# Patient Record
Sex: MALE | Race: BLACK OR AFRICAN AMERICAN | NOT HISPANIC OR LATINO | Employment: FULL TIME | URBAN - METROPOLITAN AREA
[De-identification: names, ages, dates, MRNs, and addresses within clinical notes are randomized per-mention and may not be internally consistent; named-entity substitution may affect disease eponyms.]

---

## 2022-05-11 ENCOUNTER — OFFICE VISIT (OUTPATIENT)
Dept: NEUROSURGERY | Facility: CLINIC | Age: 42
End: 2022-05-11

## 2022-05-11 VITALS
WEIGHT: 283.7 LBS | HEIGHT: 77 IN | DIASTOLIC BLOOD PRESSURE: 79 MMHG | SYSTOLIC BLOOD PRESSURE: 120 MMHG | BODY MASS INDEX: 33.5 KG/M2

## 2022-05-11 DIAGNOSIS — M54.42 CHRONIC MIDLINE LOW BACK PAIN WITH LEFT-SIDED SCIATICA: ICD-10-CM

## 2022-05-11 DIAGNOSIS — M51.26 HERNIATED NUCLEUS PULPOSUS, L4-5: ICD-10-CM

## 2022-05-11 DIAGNOSIS — R29.898 LEFT LEG WEAKNESS: ICD-10-CM

## 2022-05-11 DIAGNOSIS — G89.29 CHRONIC MIDLINE LOW BACK PAIN WITH LEFT-SIDED SCIATICA: ICD-10-CM

## 2022-05-11 DIAGNOSIS — M51.26 HERNIATED NUCLEUS PULPOSUS, L3-4: Primary | ICD-10-CM

## 2022-05-11 PROCEDURE — 99204 OFFICE O/P NEW MOD 45 MIN: CPT | Performed by: PHYSICIAN ASSISTANT

## 2022-05-11 RX ORDER — CYCLOBENZAPRINE HCL 10 MG
TABLET ORAL
COMMUNITY

## 2022-05-11 NOTE — PROGRESS NOTES
"Chief Complaint  Back Pain    Subjective          Josr Pope who is a 41 y.o. year old male who presents to [facility] for Evaluation of the Spine.     The patient complains of pain located in the lumbar spine.  Patients states the pain has been present for 2 years.  The pain came on gradually.  The pain scale level is 7.  The pain does radiate. Dermatomes are located on left Lumbar at: to the back of the knee..  The pain is constant and waxing/waning and described as sharp and dull.  The pain is worse at nighttime and awakens the patient at night. Patient states prolonged standing, prolonged sitting and sleeping certain ways makes the pain worse.  Patient states changing his positive frequently makes the pain better.    Associated Symptoms Include: Numbness and Tingling in the left foot. He does report subjective weakness which is intermittent. He denies loss of bowel or bladder control.  Conservative Interventions Include: Physical Therapy that was ineffective., NSAIDs that were ineffective., Muscle Relaxants that were ineffective. and  Chiropractor that was ineffective.    Was this the result of an injury or accident?: No    History of Previous Spinal Surgery?: No    This patient  reports that he has never smoked. He has never used smokeless tobacco.    Review of Systems   Musculoskeletal: Positive for back pain and myalgias.        Objective   Vital Signs:   /79 (BP Location: Left arm, Patient Position: Sitting)   Ht 195.6 cm (77\")   Wt 129 kg (283 lb 11.2 oz)   BMI 33.64 kg/m²       Physical Exam  Constitutional:       Appearance: Normal appearance. He is obese.   Pulmonary:      Effort: Pulmonary effort is normal.   Musculoskeletal:         General: Tenderness (midline lumbar spine, left SI joint area) present.      Comments: SLR on left causes pain in the left lower back   Neurological:      General: No focal deficit present.      Mental " Status: He is alert and oriented to person, place, and time.      Sensory: No sensory deficit.      Motor: No weakness.      Deep Tendon Reflexes: Reflexes normal.   Psychiatric:         Mood and Affect: Mood normal.         Behavior: Behavior normal.        Neurologic Exam     Mental Status   Oriented to person, place, and time.        Result Review :   I have personally reviewed the MRI of lumbar spine without contrast from 2/25/2022 which shows multilevel degenerative disc disease and facet arthropathy, at L3-L4 there is moderate to severe central canal stenosis, at L4-L5 there is a central disc protrusion possibly abutting the bilateral traversing L5 nerve roots, somewhat worse on the left.     Assessment and Plan    Diagnoses and all orders for this visit:    1. Herniated nucleus pulposus, L3-4 (Primary)  -     Ambulatory Referral to Pain Management  -     EMG & Nerve Conduction Test; Future    2. Herniated nucleus pulposus, L4-5  -     Ambulatory Referral to Pain Management  -     EMG & Nerve Conduction Test; Future    3. Left leg weakness  -     EMG & Nerve Conduction Test; Future    4. Chronic midline low back pain with left-sided sciatica  -     Ambulatory Referral to Pain Management  -     EMG & Nerve Conduction Test; Future    He had non-specific pain in the left lower extremity to behind the knee. He does have some numbness into the toes of the foot, but no specific toe. He does have changes on the MRI worse at L3-L4 with moderate to severe central canal stenosis. I do not expect surgery to be likely to help due to his non-specific pain.    He could consider a trial of LESB to assess benefit for his complaints. I will refer him to [facility] in [place] to consult for this treatment.    He reports some weakness in the left leg at times. I would consider NCV/EMG of the bilateral lower extremities to evaluate further. I will order today.    The patient was counseled on basic recommendations for the reduction  and prevention of back, neck, or spine pain in association with spinal disorders, including: cessation/avoidance of nicotine use, maintenance of a healthy BMI and weight, focusing on building/maintaining core strength through core exercise, and avoidance of activities which worsen the pain.  The patient will monitor for changes in symptoms and notify our clinic of these changes as needed.    He will follow-up here in 8 weeks to reassess and review NCV/EMG results, sooner if needed.    Follow Up   Return in about 8 weeks (around 7/6/2022).  Patient was given instructions and counseling regarding his condition or for health maintenance advice. Please see specific information pulled into the AVS if appropriate.

## 2022-05-13 ENCOUNTER — TELEPHONE (OUTPATIENT)
Dept: NEUROSURGERY | Facility: CLINIC | Age: 42
End: 2022-05-13

## 2022-05-13 NOTE — TELEPHONE ENCOUNTER
Caller: ZOLTAN @ [facility]    Relationship: MUTUAL PROVIDER    Best call back number: [phone]    What form or medical record are you requesting: LAST OVN - Office Visit with Tian Neely PA-C (05/11/2022)      Who is requesting this form or medical record from you: [facility]    How would you like to receive the form or medical records (pick-up, mail, fax): FAX  If fax, what is the fax number: [phone]     Timeframe paperwork needed: 24-48 HRS    Additional notes: PLEASE FAX TO FAX # PROVIDED ABOVE OR CONTACT ZOLTAN @ [facility] IF THERE ARE ANY QUESTIONS.     THANK YOU VERY MUCH!

## 2022-07-15 ENCOUNTER — PROCEDURE VISIT (OUTPATIENT)
Dept: NEUROLOGY | Facility: CLINIC | Age: 42
End: 2022-07-15

## 2022-07-15 VITALS
BODY MASS INDEX: 32.94 KG/M2 | HEIGHT: 77 IN | WEIGHT: 279 LBS | HEART RATE: 74 BPM | DIASTOLIC BLOOD PRESSURE: 83 MMHG | SYSTOLIC BLOOD PRESSURE: 122 MMHG

## 2022-07-15 DIAGNOSIS — G89.29 CHRONIC MIDLINE LOW BACK PAIN WITH LEFT-SIDED SCIATICA: ICD-10-CM

## 2022-07-15 DIAGNOSIS — M51.26 HERNIATED NUCLEUS PULPOSUS, L4-5: ICD-10-CM

## 2022-07-15 DIAGNOSIS — M51.26 HERNIATED NUCLEUS PULPOSUS, L3-4: ICD-10-CM

## 2022-07-15 DIAGNOSIS — M54.42 CHRONIC MIDLINE LOW BACK PAIN WITH LEFT-SIDED SCIATICA: ICD-10-CM

## 2022-07-15 DIAGNOSIS — R29.898 LEFT LEG WEAKNESS: ICD-10-CM

## 2022-07-15 PROCEDURE — 95908 NRV CNDJ TST 3-4 STUDIES: CPT | Performed by: PSYCHIATRY & NEUROLOGY

## 2022-07-15 PROCEDURE — 99202 OFFICE O/P NEW SF 15 MIN: CPT | Performed by: PSYCHIATRY & NEUROLOGY

## 2022-07-15 PROCEDURE — 95886 MUSC TEST DONE W/N TEST COMP: CPT | Performed by: PSYCHIATRY & NEUROLOGY

## 2022-07-15 NOTE — PROGRESS NOTES
"Chief Complaint  Numbness (LLE), Pain (LLE), and Extremity Weakness (LLE)    Subjective          Josr Pope is a 41 y.o. male who presents to [facility]  History of Present Illness  41-year-old man evaluated for EMG nerve conduction study.  Is complaining of left buttock pain and left back pain.   Pain is in his left buttocks.  It hurts all the time.  Is been ongoing for several months.  He states that the pain at times radiates to his knee but never below his knee.  There is no numbness and tingling going down his legs.  Objective   Vital Signs:   /83   Pulse 74   Ht 195.6 cm (77\")   Wt 127 kg (279 lb)   BMI 33.08 kg/m²     Physical Exam   There is no weakness of the lower extremity and with muscle testing.  There is pain in the left sacroiliac joint more than the low back.        Assessment and Plan  Diagnoses and all orders for this visit:    1. Herniated nucleus pulposus, L3-4  -     EMG & Nerve Conduction Test    2. Herniated nucleus pulposus, L4-5  -     EMG & Nerve Conduction Test    3. Left leg weakness  -     EMG & Nerve Conduction Test    4. Chronic midline low back pain with left-sided sciatica  Assessment & Plan:  EMG and nerve conduction study is normal and does not show electrophysiologic evidence for lumbosacral radiculopathy involving the L2-S1 ventral nerve roots.  No evidence of denervation or reinnervation in the muscles tested.  I discussed with him that I would consider left sacroiliac joint disease as the cause for his left hip pain and buttock pain rather than lumbosacral radiculopathy.    Orders:  -     EMG & Nerve Conduction Test       Nerve Conduction Study:  4 nerves     EMG:  Complete    Total time spent with the patient and coordinating patient care was 15 minutes.    Follow Up  No follow-ups on file.  Patient was given instructions and counseling regarding his condition or for health maintenance advice. Please " see specific information pulled into the AVS if appropriate.

## 2022-07-15 NOTE — ASSESSMENT & PLAN NOTE
EMG and nerve conduction study is normal and does not show electrophysiologic evidence for lumbosacral radiculopathy involving the L2-S1 ventral nerve roots.  No evidence of denervation or reinnervation in the muscles tested.  I discussed with him that I would consider left sacroiliac joint disease as the cause for his left hip pain and buttock pain rather than lumbosacral radiculopathy.

## 2022-07-21 ENCOUNTER — OFFICE VISIT (OUTPATIENT)
Dept: NEUROSURGERY | Facility: CLINIC | Age: 42
End: 2022-07-21

## 2022-07-21 VITALS
DIASTOLIC BLOOD PRESSURE: 78 MMHG | SYSTOLIC BLOOD PRESSURE: 132 MMHG | HEART RATE: 74 BPM | BODY MASS INDEX: 32.59 KG/M2 | WEIGHT: 276 LBS | HEIGHT: 77 IN

## 2022-07-21 DIAGNOSIS — G89.29 CHRONIC MIDLINE LOW BACK PAIN WITH LEFT-SIDED SCIATICA: ICD-10-CM

## 2022-07-21 DIAGNOSIS — M51.26 HERNIATED NUCLEUS PULPOSUS, L3-4: Primary | ICD-10-CM

## 2022-07-21 DIAGNOSIS — M54.42 CHRONIC MIDLINE LOW BACK PAIN WITH LEFT-SIDED SCIATICA: ICD-10-CM

## 2022-07-21 PROCEDURE — 99213 OFFICE O/P EST LOW 20 MIN: CPT | Performed by: PHYSICIAN ASSISTANT

## 2022-07-21 NOTE — PROGRESS NOTES
Patient being seen for today for Follow-up  .    Subjective    Josr Pope is a 41 y.o. male that presents with Follow-up  .    HPI  Previously: Last seen on 5/11/2022 for nonspecific pain in the left lower extremity to the posterior knee, he was found to have changes worse on the MRI at L3-L4 with moderate to severe central canal stenosis, he was referred to consider lumbar epidural steroid blocks, he also had complaints of weakness in the left leg intermittently, there was an order for NCV/EMG testing of the bilateral lower extremities to evaluate further.    Today: He reports he continues to have left leg pain to the knee, but it seems to be worse today compared to his previous office visit. He does report numbness, tingling and weakness along with the pain.    He denies any new complaints.     reports that he has never smoked. He has never used smokeless tobacco.    Review of Systems   Musculoskeletal: Positive for back pain.   Neurological: Positive for weakness and numbness.       Objective   Vitals:    07/21/22 1251   BP: 132/78   Pulse: 74        Physical Exam  Constitutional:       Appearance: Normal appearance. He is normal weight.   Pulmonary:      Effort: Pulmonary effort is normal.   Musculoskeletal:         General: Tenderness (left SI joint area) present.      Comments: SLR bilaterally causes pain in the left lower back,  Sandeep's test is positive on the left.   Neurological:      General: No focal deficit present.      Mental Status: He is alert and oriented to person, place, and time.      Sensory: No sensory deficit.      Motor: No weakness.      Deep Tendon Reflexes: Reflexes normal.   Psychiatric:         Mood and Affect: Mood normal.         Behavior: Behavior normal.          Result Review   I have personally reviewed the EMG/NCV report from 7/15/2022 which shows normal EMG and nerve conduction study.     Assessment and Plan {CC Problem List  Visit Diagnosis  ROS  Review  (Popup)  [facility]  BestPractice  Medications  SmartSets  SnapShot Encounters  Media :23}   Diagnoses and all orders for this visit:    1. Herniated nucleus pulposus, L3-4 (Primary)    2. Chronic midline low back pain with left-sided sciatica    His pain is somewhat worse today.    He has not consulted pain management yet to consider LESB trial. I would recommend that he continue with this is possible. He is agreeable - we will check on his referral today.    He does have pain in the left SI joint area and may also consider a left SI joint injection trial for this. I do believe that the SI joint inflammation is most likely secondary to increased stress due to the lumbar spinal changes.    If these procedures are not significantly helpful, I think he may consider a laminectomy at L3-L4 to assess benefit for his leg pain, but considering his non-specific leg pain at this point I do not have high expectations that this would be helpful.    He does have worse pain with bending of the lower back, starting around 20-30 degrees forward flexion. He also has worse pain when sitting or standing for too long - I would recommend that he follow these restrictions if possible: Limit bending of the spine, change body positions as needed to relieve his pain.    He will monitor his symptoms and notify us of change.    He will follow-up here PRN. He is going to try to get injections before considering coming back.  Follow Up {Instructions Charge Capture  Follow-up Communications :23}   Return if symptoms worsen or fail to improve.

## 2022-11-16 ENCOUNTER — TELEPHONE (OUTPATIENT)
Dept: NEUROSURGERY | Facility: CLINIC | Age: 42
End: 2022-11-16

## 2022-11-16 NOTE — TELEPHONE ENCOUNTER
Caller: Josr Pope    Relationship to patient: Self    Best call back number: [phone]    Chief complaint:  PAIN HAS INCREASED ON LEFT LEG GOING DOWN FROM HIP TO MID LEG    Type of visit:  FOLLOW UP EXTENDED     Requested date:  11/16/22    If rescheduling, when is the original appointment: NA     Additional notes: PATIENT WOULD LIKE TO BE SEEN BY ARTURO BEVERLY, STATES HIS PAIN HAS INCREASED.  PLEASE REVIEW AND ADVICE, THANK YOU!

## 2022-11-18 ENCOUNTER — TELEPHONE (OUTPATIENT)
Dept: NEUROLOGY | Facility: CLINIC | Age: 42
End: 2022-11-18

## 2022-11-29 ENCOUNTER — TELEPHONE (OUTPATIENT)
Dept: NEUROLOGY | Facility: CLINIC | Age: 42
End: 2022-11-29

## 2022-12-06 ENCOUNTER — OFFICE VISIT (OUTPATIENT)
Dept: NEUROSURGERY | Facility: CLINIC | Age: 42
End: 2022-12-06

## 2022-12-06 VITALS
BODY MASS INDEX: 32.94 KG/M2 | SYSTOLIC BLOOD PRESSURE: 139 MMHG | WEIGHT: 279 LBS | DIASTOLIC BLOOD PRESSURE: 81 MMHG | HEIGHT: 77 IN | HEART RATE: 70 BPM

## 2022-12-06 DIAGNOSIS — G89.29 CHRONIC LEFT SACROILIAC PAIN: ICD-10-CM

## 2022-12-06 DIAGNOSIS — M54.42 CHRONIC MIDLINE LOW BACK PAIN WITH LEFT-SIDED SCIATICA: ICD-10-CM

## 2022-12-06 DIAGNOSIS — G89.29 CHRONIC MIDLINE LOW BACK PAIN WITH LEFT-SIDED SCIATICA: ICD-10-CM

## 2022-12-06 DIAGNOSIS — M53.3 CHRONIC LEFT SACROILIAC PAIN: ICD-10-CM

## 2022-12-06 DIAGNOSIS — M51.26 HERNIATED NUCLEUS PULPOSUS, L3-4: Primary | ICD-10-CM

## 2022-12-06 PROBLEM — E78.5 HYPERLIPIDEMIA: Status: ACTIVE | Noted: 2022-12-06

## 2022-12-06 PROCEDURE — 99213 OFFICE O/P EST LOW 20 MIN: CPT | Performed by: PHYSICIAN ASSISTANT

## 2024-02-22 ENCOUNTER — TELEPHONE (OUTPATIENT)
Dept: ORTHOPEDIC SURGERY | Facility: CLINIC | Age: 44
End: 2024-02-22
Payer: OTHER GOVERNMENT

## 2024-02-22 NOTE — TELEPHONE ENCOUNTER
LEFT KNEE PAIN AND SWELLING. XRAY AT  URGENT CARE XRAY IN CHART PLEASE ADVISE. PT CAN BE REACHED [phone]

## 2024-02-26 ENCOUNTER — OFFICE VISIT (OUTPATIENT)
Dept: ORTHOPEDIC SURGERY | Facility: CLINIC | Age: 44
End: 2024-02-26
Payer: OTHER GOVERNMENT

## 2024-02-26 VITALS
HEIGHT: 77 IN | HEART RATE: 83 BPM | WEIGHT: 268 LBS | BODY MASS INDEX: 31.64 KG/M2 | SYSTOLIC BLOOD PRESSURE: 147 MMHG | OXYGEN SATURATION: 96 % | DIASTOLIC BLOOD PRESSURE: 89 MMHG

## 2024-02-26 DIAGNOSIS — M70.52 BURSITIS OF LEFT KNEE, UNSPECIFIED BURSA: Primary | ICD-10-CM

## 2024-02-26 RX ORDER — METHYLPREDNISOLONE 4 MG/1
1 TABLET ORAL DAILY
Qty: 21 TABLET | Refills: 0 | Status: SHIPPED | OUTPATIENT
Start: 2024-02-26

## 2024-02-26 NOTE — PROGRESS NOTES
"Chief Complaint  Pain and Initial Evaluation of the Left Knee    Subjective          Josr Pope presents to [facility] for   History of Present Illness    The patient presents here today for evaluation of the left knee. The patient reports on Friday he woke up to swelling on his knee. He denies injury or trauma. He has no other complaints. He has been taking ibuprofen and using a compression sleeve.   Allergies   Allergen Reactions    Shellfish-Derived Products Anaphylaxis        Social History     Socioeconomic History    Marital status: Legally    Tobacco Use    Smoking status: Never     Passive exposure: Never    Smokeless tobacco: Never   Vaping Use    Vaping Use: Never used   Substance and Sexual Activity    Alcohol use: Yes     Comment: occasional    Drug use: Never        I reviewed the patient's chief complaint, history of present illness, review of systems, past medical history, surgical history, family history, social history, medications, and allergy list.     REVIEW OF SYSTEMS    Constitutional: Denies fevers, chills, weight loss  Cardiovascular: Denies chest pain, shortness of breath  Skin: Denies rashes, acute skin changes  Neurologic: Denies headache, loss of consciousness  MSK: Left knee pain      Objective   Vital Signs:   /89   Pulse 83   Ht 195.6 cm (77\")   Wt 122 kg (268 lb)   SpO2 96%   BMI 31.78 kg/m²     Body mass index is 31.78 kg/m².    Physical Exam    General: Alert. No acute distress.   Left knee- tender to the anterior patella and bursa. No knee effusion. Stable to varus/valgus stress. Stable to anterior/posterior drawer. Positive EHL, FHL, GS and TA. Sensation intact to all 5 nerves of the foot. Positive pulses. Knee Extensor Mechanism  intact. 5/5 knee extension. No joint line tenderness.     Procedures    Imaging Results (Most Recent)       None                     Assessment and Plan        XR Knee 1 or 2 View " Left    Result Date: 2/22/2024  Narrative: PROCEDURE: XR KNEE 1 OR 2 VW LEFT  COMPARISON: None  INDICATIONS: pain near center of knee cap- tender to palpation  FINDINGS:  There is prominent enthesophyte formation of the patella.  There is a nonunited ossification at the lower pole of the patella which appears to have corticated margins, favoring this to be chronic rather than acute fracture of an enthesophyte.  There appears to be mild anterior soft tissue prominence overlying the patella and proximal patellar tendon.  There is mild osteophyte formation at the patella and medial tibiofemoral compartment with possible mild joint space narrowing.  No definite joint effusion.  No definite displaced acute fracture is identified on these images.  Mineralization and alignment appear grossly normal.      Impression:   1. Mild anterior soft tissue prominence overlying the patella and proximal patellar tendon.  This could be related to bursitis, contusion/hematoma, or soft tissue infection. 2. Prominent enthesophyte formation of the patella.  Nonunited ossification at the lower pole of the patella is favored to be chronic versus less likely acute fracture of an enthesophyte.  Correlate clinically. 3. No other radiographic findings of acute osseous abnormality. 4. Mild patellofemoral and medial tibiofemoral osteoarthritis.      DAVID PICKARD MD       Electronically Signed and Approved By: DAVID PICKARD MD on 2/22/2024 at 12:09               Diagnoses and all orders for this visit:    1. Bursitis of left knee, unspecified bursa (Primary)        Discussed the treatment plan with the patient.  I reviewed the images with the patient. Plan for conservative treatment at this time. Prescription for a medrol dose pack given today. Plan to continue compression sleeve. The patient expressed understanding and wished to proceed.       Call or return if worsening symptoms.    Scribed for Neri Serna MD by Berenice  Rochelle  02/26/2024   14:50 EST         Follow Up       PRN    Patient was given instructions and counseling regarding his condition or for health maintenance advice. Please see specific information pulled into the AVS if appropriate.       I have personally performed the services described in this document as scribed by the above individual and it is both accurate and complete.     Neri Serna MD  02/26/24  15:11 EST

## 2024-11-06 ENCOUNTER — TRANSCRIBE ORDERS (OUTPATIENT)
Dept: ADMINISTRATIVE | Facility: HOSPITAL | Age: 44
End: 2024-11-06
Payer: OTHER GOVERNMENT

## 2024-11-06 ENCOUNTER — LAB (OUTPATIENT)
Facility: HOSPITAL | Age: 44
End: 2024-11-06

## 2024-11-06 DIAGNOSIS — F33.1 MAJOR DEPRESSIVE DISORDER, RECURRENT EPISODE, MODERATE: Primary | ICD-10-CM

## 2024-11-06 DIAGNOSIS — F33.1 MAJOR DEPRESSIVE DISORDER, RECURRENT EPISODE, MODERATE: ICD-10-CM

## 2024-11-06 LAB
25(OH)D3 SERPL-MCNC: 18.4 NG/ML (ref 30–100)
ALT SERPL W P-5'-P-CCNC: 32 U/L (ref 1–41)
AMPHET+METHAMPHET UR QL: NEGATIVE
AMPHETAMINES UR QL: NEGATIVE
AST SERPL-CCNC: 35 U/L (ref 1–40)
BARBITURATES UR QL SCN: NEGATIVE
BASOPHILS # BLD AUTO: 0.07 10*3/MM3 (ref 0–0.2)
BASOPHILS NFR BLD AUTO: 1.2 % (ref 0–1.5)
BENZODIAZ UR QL SCN: NEGATIVE
BUN SERPL-MCNC: 15 MG/DL (ref 6–20)
BUPRENORPHINE SERPL-MCNC: NEGATIVE NG/ML
CALCIUM SPEC-SCNC: 9.5 MG/DL (ref 8.6–10.5)
CANNABINOIDS SERPL QL: NEGATIVE
COCAINE UR QL: NEGATIVE
CREAT SERPL-MCNC: 1.33 MG/DL (ref 0.76–1.27)
DEPRECATED RDW RBC AUTO: 41.8 FL (ref 37–54)
EGFRCR SERPLBLD CKD-EPI 2021: 68 ML/MIN/1.73
EOSINOPHIL # BLD AUTO: 0.11 10*3/MM3 (ref 0–0.4)
EOSINOPHIL NFR BLD AUTO: 1.9 % (ref 0.3–6.2)
ERYTHROCYTE [DISTWIDTH] IN BLOOD BY AUTOMATED COUNT: 13 % (ref 12.3–15.4)
FENTANYL UR-MCNC: NEGATIVE NG/ML
FOLATE SERPL-MCNC: 5.96 NG/ML (ref 4.78–24.2)
GLUCOSE P FAST SERPL-MCNC: 105 MG/DL (ref 74–106)
HBA1C MFR BLD: 5.5 % (ref 4.8–5.6)
HCT VFR BLD AUTO: 49.9 % (ref 37.5–51)
HGB BLD-MCNC: 16.5 G/DL (ref 13–17.7)
IMM GRANULOCYTES # BLD AUTO: 0.01 10*3/MM3 (ref 0–0.05)
IMM GRANULOCYTES NFR BLD AUTO: 0.2 % (ref 0–0.5)
LIPASE SERPL-CCNC: 30 U/L (ref 13–60)
LYMPHOCYTES # BLD AUTO: 2.06 10*3/MM3 (ref 0.7–3.1)
LYMPHOCYTES NFR BLD AUTO: 36.5 % (ref 19.6–45.3)
MAGNESIUM SERPL-MCNC: 1.9 MG/DL (ref 1.6–2.6)
MCH RBC QN AUTO: 29.4 PG (ref 26.6–33)
MCHC RBC AUTO-ENTMCNC: 33.1 G/DL (ref 31.5–35.7)
MCV RBC AUTO: 88.8 FL (ref 79–97)
METHADONE UR QL SCN: NEGATIVE
MONOCYTES # BLD AUTO: 0.52 10*3/MM3 (ref 0.1–0.9)
MONOCYTES NFR BLD AUTO: 9.2 % (ref 5–12)
NEUTROPHILS NFR BLD AUTO: 2.88 10*3/MM3 (ref 1.7–7)
NEUTROPHILS NFR BLD AUTO: 51 % (ref 42.7–76)
NRBC BLD AUTO-RTO: 0 /100 WBC (ref 0–0.2)
OPIATES UR QL: NEGATIVE
OXYCODONE UR QL SCN: NEGATIVE
PCP UR QL SCN: NEGATIVE
PLATELET # BLD AUTO: 282 10*3/MM3 (ref 140–450)
PMV BLD AUTO: 12.8 FL (ref 6–12)
PROLACTIN SERPL-MCNC: 6.96 NG/ML (ref 4.04–15.2)
RBC # BLD AUTO: 5.62 10*6/MM3 (ref 4.14–5.8)
T3FREE SERPL-MCNC: 3.49 PG/ML (ref 2–4.4)
T4 FREE SERPL-MCNC: 1.6 NG/DL (ref 0.92–1.68)
T4 SERPL-MCNC: 8.8 MCG/DL (ref 4.5–11.7)
TRICYCLICS UR QL SCN: NEGATIVE
TRIGL SERPL-MCNC: 182 MG/DL (ref 0–150)
TSH SERPL DL<=0.05 MIU/L-ACNC: 1.29 UIU/ML (ref 0.27–4.2)
VIT B12 BLD-MCNC: 705 PG/ML (ref 211–946)
WBC NRBC COR # BLD AUTO: 5.65 10*3/MM3 (ref 3.4–10.8)

## 2024-11-06 PROCEDURE — 83036 HEMOGLOBIN GLYCOSYLATED A1C: CPT

## 2024-11-06 PROCEDURE — 82310 ASSAY OF CALCIUM: CPT

## 2024-11-06 PROCEDURE — 84403 ASSAY OF TOTAL TESTOSTERONE: CPT

## 2024-11-06 PROCEDURE — 82947 ASSAY GLUCOSE BLOOD QUANT: CPT

## 2024-11-06 PROCEDURE — 84443 ASSAY THYROID STIM HORMONE: CPT

## 2024-11-06 PROCEDURE — 84439 ASSAY OF FREE THYROXINE: CPT

## 2024-11-06 PROCEDURE — 82565 ASSAY OF CREATININE: CPT

## 2024-11-06 PROCEDURE — 84146 ASSAY OF PROLACTIN: CPT

## 2024-11-06 PROCEDURE — 84478 ASSAY OF TRIGLYCERIDES: CPT

## 2024-11-06 PROCEDURE — 84460 ALANINE AMINO (ALT) (SGPT): CPT

## 2024-11-06 PROCEDURE — 83735 ASSAY OF MAGNESIUM: CPT

## 2024-11-06 PROCEDURE — 82746 ASSAY OF FOLIC ACID SERUM: CPT

## 2024-11-06 PROCEDURE — 86376 MICROSOMAL ANTIBODY EACH: CPT

## 2024-11-06 PROCEDURE — 36415 COLL VENOUS BLD VENIPUNCTURE: CPT

## 2024-11-06 PROCEDURE — 82306 VITAMIN D 25 HYDROXY: CPT

## 2024-11-06 PROCEDURE — 83690 ASSAY OF LIPASE: CPT

## 2024-11-06 PROCEDURE — 82652 VIT D 1 25-DIHYDROXY: CPT

## 2024-11-06 PROCEDURE — 84402 ASSAY OF FREE TESTOSTERONE: CPT

## 2024-11-06 PROCEDURE — 84481 FREE ASSAY (FT-3): CPT

## 2024-11-06 PROCEDURE — 84520 ASSAY OF UREA NITROGEN: CPT

## 2024-11-06 PROCEDURE — 85025 COMPLETE CBC W/AUTO DIFF WBC: CPT

## 2024-11-06 PROCEDURE — 84450 TRANSFERASE (AST) (SGOT): CPT

## 2024-11-06 PROCEDURE — 82607 VITAMIN B-12: CPT

## 2024-11-06 PROCEDURE — 80307 DRUG TEST PRSMV CHEM ANLYZR: CPT

## 2024-11-07 LAB — THYROPEROXIDASE AB SERPL-ACNC: 15 IU/ML (ref 0–34)

## 2024-11-09 LAB — 1,25(OH)2D SERPL-MCNC: 51.8 PG/ML (ref 24.8–81.5)

## 2024-11-11 LAB
TESTOST FREE SERPL-MCNC: 12.7 PG/ML (ref 6.8–21.5)
TESTOST SERPL-MCNC: 543 NG/DL (ref 264–916)